# Patient Record
(demographics unavailable — no encounter records)

---

## 2024-10-29 NOTE — PROCEDURE
[Subacromial Space] : subacromial space [___ cc    40mg] : Triamcinolone (Kenalog) ~Vcc of 40 mg  [Large Joint Injection] : Large joint injection [Right] : of the right [Glenohumeral Joint] : glenohumeral joint [Pain] : pain [Inflammation] : inflammation [X-ray evidence of Osteoarthritis on this or prior visit] : x-ray evidence of Osteoarthritis on this or prior visit [Alcohol] : alcohol [Betadine] : betadine [Ethyl Chloride sprayed topically] : ethyl chloride sprayed topically [Sterile technique used] : sterile technique used [___ cc    1%] : Lidocaine ~Vcc of 1%  [___ cc    0.25%] : Bupivacaine (Marcaine) ~Vcc of 0.25%  [___ cc    80mg] : Methylprednisolone (Depomedrol) ~Vcc of 80 mg  [] : Patient tolerated procedure well [Call if redness, pain or fever occur] : call if redness, pain or fever occur [Previous OTC use and PT nontherapeutic] : patient has tried OTC's including aspirin, Ibuprofen, Aleve, etc or prescription NSAIDS, and/or exercises at home and/or physical therapy without satisfactory response [Patient had decreased mobility in the joint] : patient had decreased mobility in the joint [Risks, benefits, alternatives discussed / Verbal consent obtained] : the risks benefits, and alternatives have been discussed, and verbal consent was obtained

## 2024-10-30 NOTE — HISTORY OF PRESENT ILLNESS
[6] : 6 [0] : 0 [Injection therapy] : injection therapy [FreeTextEntry5] : 54 y/o F presents for WCFU eval of the Rt. shldr today. Previous tx CSI with good relief.

## 2024-10-30 NOTE — HISTORY OF PRESENT ILLNESS
[6] : 6 [0] : 0 [Injection therapy] : injection therapy [FreeTextEntry5] : 56 y/o F presents for WCFU eval of the Rt. shldr today. Previous tx CSI with good relief.

## 2024-10-30 NOTE — ASSESSMENT
[FreeTextEntry1] : The patient is s/p 11 months out from her arthroscopic rotator cuff repair and biceps tenodesis. The patient has had injections in the past with good relief.  She still has some weakness and stiffness in the shoulder for which she is doing therapy twice a week.  She is currently not working as a .  She takes Aleve which takes the edge off. The patient has pain both laterally and anteriorly to the right shoulder. 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 She has pain and restriction of ROM with FE and IR.  Right shoulder exam: Patient has a normal neurovascular exam.  Examination of right shoulder reveals active forward elevation 120 degrees, passive range of motion 140 degrees.  She has external rotation to 45 degrees and internal rotation L5.  There is mildly positive Neer and Ramos impingement signs.  There is 5 out of 5 strength of deltoid and rotator cuff.  There is no pain or deformity over the AC joint.  There is no instability or apprehension.  The patient received both a subacromial CSI and IA injection of the right shoulder. She tolerated both well and noted slightly more relief from her IA injection. We will resubmit for PT as it was previously denied. She remains totally disabled at 100%.  She cannot go back to work as a [place] .  Right shoulder pain is causally related to her work accident.

## 2024-12-08 NOTE — HISTORY OF PRESENT ILLNESS
[] : yes [Rest] : rest [Meds] : meds [Stairs] : stairs [FreeTextEntry7] : groin and down leg [FreeTextEntry5] : 54 y/o F presents for NI eval today. Pt reports of hip pain for the past 6 months with no associated trauma. Tylenol as needed. No prior tx. Hx of Rt. THR.

## 2024-12-08 NOTE — ASSESSMENT
[FreeTextEntry1] : The patient comes in today for evaluation of her left hip.  She has had pain in the left hip for the last 7 or 8 months.  Its mostly in the groin.  She has pain with walking and stairs.  She is stiff getting in and out of a car and struggles sometimes going from sitting to standing.  She has no night pain and no trouble putting on her shoes and socks.  She is starting to limp when she walks.  She has had Advil and Aleve which definitely help.  She has done physical therapy in the past which made her worse.  Examination of the left lower extremity reveals normal neurovascular exam.  Examination of the left hip reveals decreased range of motion.  She has equal leg lengths.  She has pain at the extremes of motion.  She has no weakness of hip flexion or abduction.  There is no redness, rashes or visible scars and no point tenderness.  X-rays done in the office today of the AP pelvis 1 view of the left hip 2 views show some periarticular spurring around the left hip but no joint space narrowing.  She has a right total hip replacement in good position without signs of loosening or wear.  There are no obvious tumors, mass or calcifications seen.  The plan at this time is meloxicam 15 mg once daily with food for a week and then as needed.  Will consider an MRI in the future.  She may ultimately need a left total hip replacement.  I will see her back in the office as necessary.

## 2024-12-08 NOTE — HISTORY OF PRESENT ILLNESS
[] : yes [Rest] : rest [Meds] : meds [Stairs] : stairs [FreeTextEntry5] : 54 y/o F presents for NI eval today. Pt reports of hip pain for the past 6 months with no associated trauma. Tylenol as needed. No prior tx. Hx of Rt. THR.  [FreeTextEntry7] : groin and down leg

## 2024-12-16 NOTE — HISTORY OF PRESENT ILLNESS
[FreeTextEntry1] : Patient feels well  Patient denies lumps/new palpable masses, redness/swelling, nipple discharge  Last mammogram on 4/5/2024 at [facility]   The patient has a history for stable 5 mm hypoechoic nodule right breast 1:00 axis, 1 cm from the nipple  Her mammogram is normal

## 2024-12-16 NOTE — PROCEDURE
[FreeTextEntry3] : Ultrasound right breast  There is a stable 5 mm hypoechoic nodule in the 1:00 axis of the right breast  There is no suspicious shadowing or architectural distortion  BI-RADS 2

## 2024-12-16 NOTE — ASSESSMENT
[FreeTextEntry1] : Right breast mass appears stable when compared to prior imaging  There is no associated palpable correlation  The patient was reassured  She was asked to resume normal breast screening in April 2025  A total of 20 minutes was spent in consultation

## 2025-01-27 NOTE — ASSESSMENT
[FreeTextEntry1] : The patient comes in today for follow-up on her right shoulder.  She continues to have pain in the right shoulder following her rotator cuff repair and biceps tenodesis in December 2023.  She mostly has pain with overhead activity.  She gets occasional night pain.  She had x-rays in July 2024 which shows posttraumatic arthritis.  She had a cortisone shot both intra-articularly in the subacromial space on her last visit which lasted about 6 weeks.  She is currently taking meloxicam 15 mg daily which is helping take the edge off.  She is currently not working.  Examination of the right upper extremity reveals a normal neurovascular exam.  Examination of the right shoulder reveals active forward elevation 140 degrees, external rotation to 45 degrees, and internal rotation to L5.  There is 5 out of 5 strength of deltoid and rotator cuff.  There is pain at the extremes of motion with mild crepitation.  There is no instability or apprehension.  There is a negative speeds test.  There is negative Neer and Ramos impingement signs.  There is no redness or rashes and her arthroscopy scars are well-healed.  The plan at this time is to continue her home exercise program.  She will continue the meloxicam 15 mg daily and as needed because this seems to be helping.  She will follow-up in the office in 3 months.  She has a 100% temporary disability to the right shoulder.  Her right shoulder pain is causally related to her work accident.  I will see her back in the office in 3 months.

## 2025-01-27 NOTE — HISTORY OF PRESENT ILLNESS
[4] : 4 [0] : 0 [Injection therapy] : injection therapy [FreeTextEntry5] : 54 y/o F presents for WCFU eval of the Rt. shldr today. Previous tx CSI with good relief. Pain still present but has improved slightly.

## 2025-04-30 NOTE — HISTORY OF PRESENT ILLNESS
[4] : 4 [0] : 0 [Injection therapy] : injection therapy [FreeTextEntry5] : 54 y/o F presents for FU eval of the Rt. shldr today. pt noted to not have any improvement since last visit. has some discomfort when sleeping

## 2025-04-30 NOTE — ASSESSMENT
[FreeTextEntry1] : The patient comes in today for follow-up on her right shoulder and her left hip.  Her right shoulder continues to show some slight improvements with regard to pain as she has been taking meloxicam since December.  She has been doing some therapy and home exercises and is definitely improved.  Unfortunately she does suffer from posttraumatic arthritis and has significant difficulty with her range of motion.  The other issue is her left hip.  It is gotten progressively worse over the last several months.  The pain is mostly in the groin consistent with mild to moderate arthritis.  She struggles mostly going up and down stairs.  She has mild pain with walking and shoes and socks.  She has some stiffness getting out of bed in the morning and going from sitting to standing.  Examination of the right upper extremity reveals a normal neurovascular exam.  Examination of the right shoulder reveals some limitation of range of motion with forward elevation 120, external rotation of 30, and internal rotation L5.  She has 5 out of 5 strength of deltoid and rotator cuff.  There is pain at the extremes of motion.  She has mildly positive impingement signs.  There is no instability or apprehension.  She has no tenderness or deformity over the AC joint.  Examination of the left lower extremity feels normal neurovascular exam.  Examination of the left hip reveals equal leg lengths.  She has full range of motion with pain at the extremes.  There is moderate tenderness over the greater trochanter.  There is no weakness of hip flexion or abduction.  There is no redness, rashes or visible scars.  Under sterile conditions the right shoulder was injected intra-articularly with 5 cc of quarter percent plain Marcaine 5 cc of 2% plain lidocaine and 80 mg of Depo-Medrol.  The patient tolerated the procedure well.  The plan at this time is to get an MRI of the left hip to evaluate for arthritis or labral tear or possibly an abductor tear.  We will decide on a course of action following the study.  I will see her back in the office in 3 months as needed.

## 2025-06-23 NOTE — HISTORY OF PRESENT ILLNESS
[4] : 4 [0] : 0 [Injection therapy] : injection therapy [] : yes [Rest] : rest [Meds] : meds [Stairs] : stairs [FreeTextEntry5] : 55 y/o F presents for f/u eval today. Pt would like injection today.  [FreeTextEntry7] : groin and down leg

## 2025-06-23 NOTE — ASSESSMENT
[FreeTextEntry1] : The patient is here for her left hip pain. She has both increased groin pain and lateral hip pain. She has pain with ambulation. It is gotten progressively worse over the last several months.  The pain is mostly in the groin consistent with mild to moderate arthritis.  She struggles mostly going up and down stairs.  She has mild pain with walking and shoes and socks.  She has some stiffness getting out of bed in the morning and going from sitting to standing.  Left hip exam: Neurovascularly intact. Sensation intact. Examination of the left hip reveals equal leg lengths.  She has full range of motion with pain at the extremes.  There is moderate tenderness over the greater trochanter.  There is no weakness of hip flexion or abduction.  There is no redness, rashes or visible scars.  Under sterile conditions the left hip was injected on the greater trochanter with 5 cc of quarter percent plain Marcaine 5 cc of 2% plain lidocaine and 80 mg of Kenalog.  The patient tolerated the procedure well.  The patient received a left hip trochanteric injection. She tolerated it well. She will also go for an IA injection of the left hip. She will return as needed.

## 2025-06-23 NOTE — HISTORY OF PRESENT ILLNESS
[4] : 4 [0] : 0 [Injection therapy] : injection therapy [] : yes [Rest] : rest [Meds] : meds [Stairs] : stairs [FreeTextEntry5] : 57 y/o F presents for f/u eval today. Pt would like injection today.  [FreeTextEntry7] : groin and down leg

## 2025-06-23 NOTE — PROCEDURE
[Large Joint Injection] : Large joint injection [Left] : of the left [Greater Trochanteric Bursa] : greater trochanteric bursa [Pain] : pain [Inflammation] : inflammation [Alcohol] : alcohol [Betadine] : betadine [Ethyl Chloride sprayed topically] : ethyl chloride sprayed topically [Sterile technique used] : sterile technique used [___ cc    1%] : Lidocaine ~Vcc of 1%  [___ cc    0.25%] : Bupivacaine (Marcaine) ~Vcc of 0.25%  [___ cc    40mg] : Triamcinolone (Kenalog) ~Vcc of 40 mg  [] : Patient tolerated procedure well [Call if redness, pain or fever occur] : call if redness, pain or fever occur [Previous OTC use and PT nontherapeutic] : patient has tried OTC's including aspirin, Ibuprofen, Aleve, etc or prescription NSAIDS, and/or exercises at home and/or physical therapy without satisfactory response [Patient had decreased mobility in the joint] : patient had decreased mobility in the joint [Risks, benefits, alternatives discussed / Verbal consent obtained] : the risks benefits, and alternatives have been discussed, and verbal consent was obtained

## 2025-06-25 NOTE — REASON FOR VISIT
[Annual] : an annual visit. [FreeTextEntry2] : of note- pt has been diagnosed with Graves Disease along with LASHELL.

## 2025-06-25 NOTE — HISTORY OF PRESENT ILLNESS
[LMP unknown] : LMP unknown [postmenopausal] : postmenopausal [N] : Patient does not use contraception [Y] : Positive pregnancy history [Currently Active] : currently active [Men] : men [unknown] : Patient is unsure of the date of her LMP [TextBox_4] : Kristen is here for her annual exam.   [Mammogramdate] : 04/21/25 [TextBox_19] : BR1 [BreastSonogramDate] : 04/21/25 [TextBox_25] : BR1 [PapSmeardate] : 06/19/24 [TextBox_31] : NEG [TextBox_37] : pt has never had bone density [ColonoscopyDate] : 2023 [TextBox_43] : DUE 2028 [HIVDate] : ---- [SyphilisDate] : ---- [GonorrheaDate] : 11/17/22 [TextBox_63] : NEG [ChlamydiaDate] : 11/17/22 [TextBox_68] : NEG [HPVDate] : 06/19/24 [TextBox_78] : NEG [HepatitisBDate] : ---- [HepatitisCDate] : ---- [PGHxTotal] : 5 [[facility] [[facility]iving] : 1 [PGHxABInduced] : 2 [PGHxABSpont] : 2 [FreeTextEntry1] : : 1